# Patient Record
Sex: MALE | Race: WHITE | NOT HISPANIC OR LATINO | URBAN - METROPOLITAN AREA
[De-identification: names, ages, dates, MRNs, and addresses within clinical notes are randomized per-mention and may not be internally consistent; named-entity substitution may affect disease eponyms.]

---

## 2022-07-09 ENCOUNTER — TELEPHONE ENCOUNTER (OUTPATIENT)
Dept: URBAN - METROPOLITAN AREA CLINIC 121 | Facility: CLINIC | Age: 62
End: 2022-07-09

## 2022-07-09 RX ORDER — PHENYTOIN SODIUM 100 MG/1
CAPSULE ORAL
Refills: 0 | OUTPATIENT
Start: 2017-03-30 | End: 2017-03-30

## 2022-07-09 RX ORDER — GABAPENTIN 100 MG/1
CAPSULE ORAL TWICE A DAY
Refills: 0 | OUTPATIENT
Start: 2017-03-30 | End: 2017-03-30

## 2022-07-09 RX ORDER — PHENYTOIN SODIUM EXTENDED 100 MG
CAPSULE ORAL
Refills: 0 | OUTPATIENT
Start: 2009-07-02 | End: 2017-03-30

## 2022-07-09 RX ORDER — PHENYTOIN SODIUM EXTENDED 100 MG
CAPSULE ORAL
Refills: 0 | OUTPATIENT
Start: 2017-03-30 | End: 2017-03-30

## 2022-07-10 ENCOUNTER — TELEPHONE ENCOUNTER (OUTPATIENT)
Dept: URBAN - METROPOLITAN AREA CLINIC 121 | Facility: CLINIC | Age: 62
End: 2022-07-10

## 2022-07-10 RX ORDER — PHENYTOIN SODIUM EXTENDED 100 MG
CAPSULE ORAL TWICE A DAY
Refills: 0 | Status: ACTIVE | COMMUNITY
Start: 2017-03-30

## 2022-07-10 RX ORDER — GABAPENTIN 100 MG/1
CAPSULE ORAL TWICE A DAY
Refills: 0 | Status: ACTIVE | COMMUNITY
Start: 2017-03-30

## 2022-12-06 ENCOUNTER — P2P PATIENT RECORD (OUTPATIENT)
Age: 62
End: 2022-12-06

## 2022-12-07 ENCOUNTER — TELEPHONE ENCOUNTER (OUTPATIENT)
Dept: URBAN - METROPOLITAN AREA CLINIC 63 | Facility: CLINIC | Age: 62
End: 2022-12-07

## 2023-02-07 ENCOUNTER — OFFICE VISIT (OUTPATIENT)
Dept: URBAN - METROPOLITAN AREA CLINIC 63 | Facility: CLINIC | Age: 63
End: 2023-02-07

## 2023-02-07 RX ORDER — GABAPENTIN 100 MG/1
CAPSULE ORAL TWICE A DAY
Refills: 0 | Status: ACTIVE | COMMUNITY
Start: 2017-03-30

## 2023-02-07 RX ORDER — PHENYTOIN SODIUM EXTENDED 100 MG
CAPSULE ORAL TWICE A DAY
Refills: 0 | Status: ACTIVE | COMMUNITY
Start: 2017-03-30

## 2023-02-08 ENCOUNTER — DASHBOARD ENCOUNTERS (OUTPATIENT)
Age: 63
End: 2023-02-08

## 2023-02-08 ENCOUNTER — OFFICE VISIT (OUTPATIENT)
Dept: URBAN - METROPOLITAN AREA CLINIC 63 | Facility: CLINIC | Age: 63
End: 2023-02-08
Payer: COMMERCIAL

## 2023-02-08 VITALS
HEIGHT: 70 IN | TEMPERATURE: 98.2 F | WEIGHT: 233 LBS | SYSTOLIC BLOOD PRESSURE: 140 MMHG | DIASTOLIC BLOOD PRESSURE: 90 MMHG | OXYGEN SATURATION: 97 % | HEART RATE: 80 BPM | BODY MASS INDEX: 33.36 KG/M2

## 2023-02-08 DIAGNOSIS — Z12.11 COLON CANCER SCREENING: ICD-10-CM

## 2023-02-08 PROCEDURE — 99203 OFFICE O/P NEW LOW 30 MIN: CPT | Performed by: NURSE PRACTITIONER

## 2023-02-08 RX ORDER — METFORMIN HYDROCHLORIDE 500 MG/1
1 TABLET WITH A MEAL TABLET, FILM COATED ORAL ONCE A DAY
Status: ACTIVE | COMMUNITY

## 2023-02-08 RX ORDER — LEVOTHYROXINE SODIUM 50 UG/1
1 TABLET IN THE MORNING ON AN EMPTY STOMACH TABLET ORAL ONCE A DAY
Status: ACTIVE | COMMUNITY

## 2023-02-08 RX ORDER — ATORVASTATIN CALCIUM 10 MG/1
1 TABLET TABLET, FILM COATED ORAL ONCE A DAY
Status: ACTIVE | COMMUNITY

## 2023-02-08 RX ORDER — PHENYTOIN 50 MG/1
2 TABLETS TABLET, CHEWABLE ORAL BID
Status: ACTIVE | COMMUNITY

## 2023-02-08 RX ORDER — CIPROFLOXACIN AND DEXAMETHASONE 3; 1 MG/ML; MG/ML
SHAKE LIQUID AND INSTILL 4 DROPS TO AFFECTED EAR TWICE DAILY FOR 7 DAYS SUSPENSION/ DROPS AURICULAR (OTIC)
Qty: 7.5 MILLILITER | Refills: 0 | Status: ACTIVE | COMMUNITY

## 2023-02-08 NOTE — PHYSICAL EXAM GASTROINTESTINAL
soft, nontender, nondistended , no masses palpable , normal bowel sounds , Liver and Spleen normal in size. Rectal exam - heme negative.

## 2023-02-08 NOTE — HPI-PREVIOUS PROCEDURES
His last colonoscopy was in May 2017 with findings of left-sided diverticulosis and small internal hemorrhoids.

## 2023-02-08 NOTE — HPI-TODAY'S VISIT:
This is a 62-year-old male patient with a family history of polyps in his brother, who presents to the office for colorectal cancer screening.  Today he denies any current bowel problems.  No diarrhea, constipation, changes in bowel habits, abdominal pain, rectal bleeding or weight loss. No reflux or heartburn. He moves his bowels 1-2 times daily.

## 2023-02-15 ENCOUNTER — LAB OUTSIDE AN ENCOUNTER (OUTPATIENT)
Dept: URBAN - METROPOLITAN AREA CLINIC 63 | Facility: CLINIC | Age: 63
End: 2023-02-15

## 2023-03-01 ENCOUNTER — OFFICE VISIT (OUTPATIENT)
Dept: URBAN - METROPOLITAN AREA SURGERY CENTER 4 | Facility: SURGERY CENTER | Age: 63
End: 2023-03-01
Payer: COMMERCIAL

## 2023-03-01 DIAGNOSIS — K64.1 GRADE II INTERNAL HEMORRHOIDS: ICD-10-CM

## 2023-03-01 DIAGNOSIS — Z12.11 COLON CANCER SCREENING: ICD-10-CM

## 2023-03-01 DIAGNOSIS — K57.30 DIVERTCULOSIS OF LG INT W/O PERFORATION OR ABSCESS W/O BLEEDING: ICD-10-CM

## 2023-03-01 PROCEDURE — G0121 COLON CA SCRN NOT HI RSK IND: HCPCS | Performed by: INTERNAL MEDICINE

## 2023-03-01 RX ORDER — PHENYTOIN 50 MG/1
2 TABLETS TABLET, CHEWABLE ORAL BID
Status: ACTIVE | COMMUNITY

## 2023-03-01 RX ORDER — CIPROFLOXACIN AND DEXAMETHASONE 3; 1 MG/ML; MG/ML
SHAKE LIQUID AND INSTILL 4 DROPS TO AFFECTED EAR TWICE DAILY FOR 7 DAYS SUSPENSION/ DROPS AURICULAR (OTIC)
Qty: 7.5 MILLILITER | Refills: 0 | Status: ACTIVE | COMMUNITY

## 2023-03-01 RX ORDER — LEVOTHYROXINE SODIUM 50 UG/1
1 TABLET IN THE MORNING ON AN EMPTY STOMACH TABLET ORAL ONCE A DAY
Status: ACTIVE | COMMUNITY

## 2023-03-01 RX ORDER — ATORVASTATIN CALCIUM 10 MG/1
1 TABLET TABLET, FILM COATED ORAL ONCE A DAY
Status: ACTIVE | COMMUNITY

## 2023-03-01 RX ORDER — METFORMIN HYDROCHLORIDE 500 MG/1
1 TABLET WITH A MEAL TABLET, FILM COATED ORAL ONCE A DAY
Status: ACTIVE | COMMUNITY

## 2023-03-03 PROBLEM — 398050005 DIVERTICULAR DISEASE OF COLON: Status: ACTIVE | Noted: 2023-03-03
